# Patient Record
Sex: MALE | Race: WHITE | NOT HISPANIC OR LATINO | Employment: OTHER | URBAN - METROPOLITAN AREA
[De-identification: names, ages, dates, MRNs, and addresses within clinical notes are randomized per-mention and may not be internally consistent; named-entity substitution may affect disease eponyms.]

---

## 2018-05-04 ENCOUNTER — HOSPITAL ENCOUNTER (EMERGENCY)
Facility: HOSPITAL | Age: 63
Discharge: HOME | End: 2018-05-04
Attending: EMERGENCY MEDICINE
Payer: MEDICARE

## 2018-05-04 VITALS
SYSTOLIC BLOOD PRESSURE: 130 MMHG | HEART RATE: 83 BPM | DIASTOLIC BLOOD PRESSURE: 83 MMHG | RESPIRATION RATE: 18 BRPM | BODY MASS INDEX: 24.34 KG/M2 | OXYGEN SATURATION: 99 % | WEIGHT: 170 LBS | HEIGHT: 70 IN

## 2018-05-04 DIAGNOSIS — S01.81XA FACIAL LACERATION, INITIAL ENCOUNTER: Primary | ICD-10-CM

## 2018-05-04 PROCEDURE — 12014 RPR F/E/E/N/L/M 5.1-7.5 CM: CPT

## 2018-05-04 PROCEDURE — 63600175 PHARM REV CODE 636 W HCPCS: Performed by: PHYSICIAN ASSISTANT

## 2018-05-04 PROCEDURE — 12015 RPR F/E/E/N/L/M 7.6-12.5 CM: CPT | Mod: ,,, | Performed by: PHYSICIAN ASSISTANT

## 2018-05-04 PROCEDURE — 99283 EMERGENCY DEPT VISIT LOW MDM: CPT | Mod: 25,,, | Performed by: PHYSICIAN ASSISTANT

## 2018-05-04 PROCEDURE — 90715 TDAP VACCINE 7 YRS/> IM: CPT | Performed by: PHYSICIAN ASSISTANT

## 2018-05-04 PROCEDURE — 25000003 PHARM REV CODE 250: Performed by: PHYSICIAN ASSISTANT

## 2018-05-04 PROCEDURE — 99283 EMERGENCY DEPT VISIT LOW MDM: CPT | Mod: 25

## 2018-05-04 PROCEDURE — 90471 IMMUNIZATION ADMIN: CPT | Performed by: PHYSICIAN ASSISTANT

## 2018-05-04 RX ORDER — HYDROCODONE BITARTRATE AND ACETAMINOPHEN 5; 325 MG/1; MG/1
1 TABLET ORAL
Status: COMPLETED | OUTPATIENT
Start: 2018-05-04 | End: 2018-05-04

## 2018-05-04 RX ORDER — LIDOCAINE HYDROCHLORIDE AND EPINEPHRINE 10; 10 MG/ML; UG/ML
10 INJECTION, SOLUTION INFILTRATION; PERINEURAL
Status: COMPLETED | OUTPATIENT
Start: 2018-05-04 | End: 2018-05-04

## 2018-05-04 RX ORDER — HYDROCODONE BITARTRATE AND ACETAMINOPHEN 5; 325 MG/1; MG/1
1 TABLET ORAL EVERY 4 HOURS PRN
Qty: 10 TABLET | Refills: 0 | Status: SHIPPED | OUTPATIENT
Start: 2018-05-04 | End: 2024-03-25

## 2018-05-04 RX ADMIN — LIDOCAINE HYDROCHLORIDE AND EPINEPHRINE 10 ML: 10; 10 INJECTION, SOLUTION INFILTRATION; PERINEURAL at 04:05

## 2018-05-04 RX ADMIN — HYDROCODONE BITARTRATE AND ACETAMINOPHEN 1 TABLET: 5; 325 TABLET ORAL at 04:05

## 2018-05-04 RX ADMIN — CLOSTRIDIUM TETANI TOXOID ANTIGEN (FORMALDEHYDE INACTIVATED), CORYNEBACTERIUM DIPHTHERIAE TOXOID ANTIGEN (FORMALDEHYDE INACTIVATED), BORDETELLA PERTUSSIS TOXOID ANTIGEN (GLUTARALDEHYDE INACTIVATED), BORDETELLA PERTUSSIS FILAMENTOUS HEMAGGLUTININ ANTIGEN (FORMALDEHYDE INACTIVATED), BORDETELLA PERTUSSIS PERTACTIN ANTIGEN, AND BORDETELLA PERTUSSIS FIMBRIAE 2/3 ANTIGEN 0.5 ML: 5; 2; 2.5; 5; 3; 5 INJECTION, SUSPENSION INTRAMUSCULAR at 04:05

## 2018-05-04 NOTE — ED PROVIDER NOTES
Encounter Date: 5/4/2018    SCRIBE #1 NOTE: I, Loraine Garcia, am scribing for, and in the presence of,  Dr. Graf . I have scribed the following portions of the note - the APC attestation.       History     Chief Complaint   Patient presents with    Laceration     reports slipping and falling onto knife at home tonight, long laceration noted to right cheek, bleeding currently controlled with gauze      61 yo M with laceration to R side of face. Pt reports falling on a knife @ home while cooking. Bleeding controlled @ this time. Tetanus status is unknown.           Review of patient's allergies indicates:   Allergen Reactions    Codeine Nausea Only    Toradol [ketorolac]      headahce     Past Medical History:   Diagnosis Date    Back pain, chronic      Past Surgical History:   Procedure Laterality Date    BACK SURGERY      EYE SURGERY       No family history on file.  Social History   Substance Use Topics    Smoking status: Current Every Day Smoker     Packs/day: 0.25     Types: Cigarettes    Smokeless tobacco: Not on file    Alcohol use No     Review of Systems   Constitutional: Negative for fever.   HENT: Negative for sore throat.    Respiratory: Negative for shortness of breath.    Cardiovascular: Negative for chest pain.   Gastrointestinal: Negative for nausea.   Genitourinary: Negative for dysuria.   Musculoskeletal: Negative for back pain.   Skin: Positive for wound. Negative for rash.   Neurological: Negative for weakness.   Hematological: Does not bruise/bleed easily.       Physical Exam     Initial Vitals [05/04/18 0359]   BP Pulse Resp Temp SpO2   130/83 83 18 -- 99 %      MAP       98.67         Physical Exam    Constitutional: Vital signs are normal. He appears well-developed and well-nourished. He is not diaphoretic. No distress.   HENT:   Head: Normocephalic and atraumatic.   Right Ear: External ear normal.   Left Ear: External ear normal.   Eyes: Conjunctivae are normal.    Cardiovascular: Normal rate, regular rhythm and normal heart sounds. Exam reveals no gallop and no friction rub.    No murmur heard.  Pulmonary/Chest: No respiratory distress. He has no wheezes. He has no rhonchi. He has no rales. He exhibits no tenderness.   Abdominal: Soft. Normal appearance and bowel sounds are normal. He exhibits no distension and no mass. There is no tenderness. There is no rebound and no guarding.   Musculoskeletal: Normal range of motion.   Neurological: He is alert and oriented to person, place, and time.   Skin: Skin is warm and intact.   Linear laceration R cheek,  From eye to just above the lip, approx 4 inches.   Clean edges, not deep, not through and through    Neurovascular intact     Psychiatric: He has a normal mood and affect. His speech is normal and behavior is normal. Cognition and memory are normal.         ED Course   Lac Repair  Date/Time: 5/4/2018 5:46 AM  Performed by: JENNIE SHARMA  Authorized by: BENJA KLEIN   Body area: head/neck  Location details: right cheek  Laceration length: 9 cm  Foreign bodies: no foreign bodies  Tendon involvement: none  Nerve involvement: none  Vascular damage: no  Anesthesia: local infiltration    Anesthesia:  Local Anesthetic: lidocaine 1% with epinephrine  Anesthetic total: 5 mL  Patient sedated: no  Preparation: Patient was prepped and draped in the usual sterile fashion.  Irrigation solution: saline  Irrigation method: jet lavage  Amount of cleaning: standard  Debridement: none  Degree of undermining: none  Skin closure: 6-0 Prolene  Number of sutures: 10  Technique: simple  Approximation: close  Approximation difficulty: simple  Dressing: 4x4 sterile gauze  Patient tolerance: Patient tolerated the procedure well with no immediate complications        Labs Reviewed - No data to display          Medical Decision Making:   ED Management:  61 yo M with facial laceration  Lac repaired by me in the ED without complications  Wound  care instructions given. Recommended followup with PCP in 2 days for wound check and 5 days for suture removal. Retrun instructions given.             Scribe Attestation:   Scribe #1: I performed the above scribed service and the documentation accurately describes the services I performed. I attest to the accuracy of the note.    Attending Attestation:     Physician Attestation Statement for NP/PA:   I discussed this assessment and plan of this patient with the NP/PA, but I did not personally examine the patient. The face to face encounter was performed by the NP/PA.                     Clinical Impression:   The encounter diagnosis was Facial laceration, initial encounter.    Disposition:   Disposition: Discharged  Condition: Stable                        Christopher Martin PA-C  05/04/18 0510

## 2018-05-04 NOTE — ED NOTES
"Chief Complaint   Patient presents with    Laceration     reports slipping and falling onto knife at home tonight, long laceration noted to right cheek, bleeding currently controlled with gauze      Pt presents to ED after he was "cutting up eggs and I dropped some on the floor. I thought I had cleaned it up but I slipped on it and I fell onto the knife." +Lac to right cheek. Bleeding controlled at this time- gauze placed to right cheek in triage. States he had a tetanus shot within the last 5 years.     Adult physical assessment:    Neuro: Patient AAOx4.   HEENT: PERRLA, no purulent drainage to nares, throat, eyes. +Lac to right cheek. Right eye missing from prior incident in 1995 that was unrelated to tonight's incident.  Musculoskeletal: Patient moving all extremities spontaneously. No obvious swelling or deformity visible.    Active Ambulatory Problems     Diagnosis Date Noted    Laceration 09/09/2013    Facial trauma 09/09/2013     Resolved Ambulatory Problems     Diagnosis Date Noted    No Resolved Ambulatory Problems     Past Medical History:   Diagnosis Date    Back pain, chronic      Review of patient's allergies indicates:   Allergen Reactions    Codeine Nausea Only    Toradol [ketorolac]      TONIE Worley, at bedside.  "

## 2018-05-04 NOTE — DISCHARGE INSTRUCTIONS
Followup with PCP  Clean wound with soap and water  Return to the ED for any new symptoms      Our goal in the emergency department is to always give you outstanding care and exceptional service. You may receive a survey by mail or e-mail in the next week regarding your experience in our ED. We would greatly appreciate your completing and returning the survey. Your feedback provides us with a way to recognize our staff who give very good care and it helps us learn how to improve when your experience was below our aspiration of excellence.

## 2018-12-26 ENCOUNTER — HOSPITAL ENCOUNTER (EMERGENCY)
Facility: HOSPITAL | Age: 63
Discharge: HOME | End: 2018-12-26
Attending: EMERGENCY MEDICINE
Payer: MEDICARE

## 2018-12-26 VITALS
RESPIRATION RATE: 20 BRPM | WEIGHT: 150 LBS | TEMPERATURE: 98 F | SYSTOLIC BLOOD PRESSURE: 139 MMHG | BODY MASS INDEX: 21.47 KG/M2 | HEART RATE: 79 BPM | HEIGHT: 70 IN | OXYGEN SATURATION: 99 % | DIASTOLIC BLOOD PRESSURE: 70 MMHG

## 2018-12-26 DIAGNOSIS — K59.00 CONSTIPATION, UNSPECIFIED CONSTIPATION TYPE: Primary | ICD-10-CM

## 2018-12-26 LAB
ALBUMIN SERPL BCP-MCNC: 3.8 G/DL
ALP SERPL-CCNC: 88 U/L
ALT SERPL W/O P-5'-P-CCNC: 14 U/L
ANION GAP SERPL CALC-SCNC: 9 MMOL/L
AST SERPL-CCNC: 20 U/L
BASOPHILS # BLD AUTO: 0.05 K/UL
BASOPHILS NFR BLD: 0.5 %
BILIRUB SERPL-MCNC: 0.2 MG/DL
BUN SERPL-MCNC: 10 MG/DL
BUN SERPL-MCNC: 10 MG/DL (ref 6–30)
CALCIUM SERPL-MCNC: 9.3 MG/DL
CHLORIDE SERPL-SCNC: 104 MMOL/L
CHLORIDE SERPL-SCNC: 99 MMOL/L (ref 95–110)
CO2 SERPL-SCNC: 30 MMOL/L
CREAT SERPL-MCNC: 0.9 MG/DL
CREAT SERPL-MCNC: 0.9 MG/DL (ref 0.5–1.4)
DIFFERENTIAL METHOD: ABNORMAL
EOSINOPHIL # BLD AUTO: 0.1 K/UL
EOSINOPHIL NFR BLD: 1.5 %
ERYTHROCYTE [DISTWIDTH] IN BLOOD BY AUTOMATED COUNT: 12.8 %
EST. GFR  (AFRICAN AMERICAN): >60 ML/MIN/1.73 M^2
EST. GFR  (NON AFRICAN AMERICAN): >60 ML/MIN/1.73 M^2
GLUCOSE SERPL-MCNC: 102 MG/DL (ref 70–110)
GLUCOSE SERPL-MCNC: 99 MG/DL
HCT VFR BLD AUTO: 43.3 %
HCT VFR BLD CALC: 43 %PCV (ref 36–54)
HGB BLD-MCNC: 14.2 G/DL
IMM GRANULOCYTES # BLD AUTO: 0.01 K/UL
IMM GRANULOCYTES NFR BLD AUTO: 0.1 %
LIPASE SERPL-CCNC: 24 U/L
LYMPHOCYTES # BLD AUTO: 2.9 K/UL
LYMPHOCYTES NFR BLD: 31.8 %
MCH RBC QN AUTO: 31.3 PG
MCHC RBC AUTO-ENTMCNC: 32.8 G/DL
MCV RBC AUTO: 95 FL
MONOCYTES # BLD AUTO: 0.7 K/UL
MONOCYTES NFR BLD: 7.4 %
NEUTROPHILS # BLD AUTO: 5.3 K/UL
NEUTROPHILS NFR BLD: 58.7 %
NRBC BLD-RTO: 0 /100 WBC
PLATELET # BLD AUTO: 276 K/UL
PMV BLD AUTO: 11.4 FL
POC IONIZED CALCIUM: 1.12 MMOL/L (ref 1.06–1.42)
POC TCO2 (MEASURED): 29 MMOL/L (ref 23–29)
POTASSIUM BLD-SCNC: 4 MMOL/L (ref 3.5–5.1)
POTASSIUM SERPL-SCNC: 4.1 MMOL/L
PROT SERPL-MCNC: 7.4 G/DL
RBC # BLD AUTO: 4.54 M/UL
SAMPLE: NORMAL
SODIUM BLD-SCNC: 144 MMOL/L (ref 136–145)
SODIUM SERPL-SCNC: 143 MMOL/L
WBC # BLD AUTO: 9.1 K/UL

## 2018-12-26 PROCEDURE — 99284 EMERGENCY DEPT VISIT MOD MDM: CPT | Mod: ,,, | Performed by: EMERGENCY MEDICINE

## 2018-12-26 PROCEDURE — 85025 COMPLETE CBC W/AUTO DIFF WBC: CPT

## 2018-12-26 PROCEDURE — 83690 ASSAY OF LIPASE: CPT

## 2018-12-26 PROCEDURE — 25000003 PHARM REV CODE 250: Performed by: EMERGENCY MEDICINE

## 2018-12-26 PROCEDURE — 80053 COMPREHEN METABOLIC PANEL: CPT

## 2018-12-26 PROCEDURE — 25500020 PHARM REV CODE 255: Performed by: EMERGENCY MEDICINE

## 2018-12-26 PROCEDURE — 99285 EMERGENCY DEPT VISIT HI MDM: CPT | Mod: 25

## 2018-12-26 RX ORDER — SENNOSIDES 8.6 MG/1
1 TABLET ORAL DAILY
COMMUNITY
Start: 2018-12-26 | End: 2024-03-25

## 2018-12-26 RX ORDER — ACETAMINOPHEN 500 MG
1000 TABLET ORAL
Status: COMPLETED | OUTPATIENT
Start: 2018-12-26 | End: 2018-12-26

## 2018-12-26 RX ORDER — POLYETHYLENE GLYCOL 3350 17 G/17G
17 POWDER, FOR SOLUTION ORAL DAILY
Qty: 119 G | Refills: 0 | Status: SHIPPED | OUTPATIENT
Start: 2018-12-26 | End: 2024-03-25

## 2018-12-26 RX ADMIN — IOHEXOL 75 ML: 350 INJECTION, SOLUTION INTRAVENOUS at 10:12

## 2018-12-26 RX ADMIN — ACETAMINOPHEN 1000 MG: 500 TABLET ORAL at 07:12

## 2018-12-27 NOTE — ED TRIAGE NOTES
63 year old male presents to the ED c/o left sided flank pain. Reports that it began Monday. Feels like pulled muscle. Pain worsened today.

## 2018-12-27 NOTE — ED PROVIDER NOTES
"Encounter Date: 12/26/2018    SCRIBE #1 NOTE: I, Aziza Vega, am scribing for, and in the presence of,  Dr. Streeter. I have scribed the entire note.       History     Chief Complaint   Patient presents with    Flank Pain     Time patient was seen by the provider: 7:30 PM      The patient is a 63 y.o. male with co-morbidities including: chronic back pain and depression, who presents to the ED with a complaint of left flank pain for 3 days. Patient reports he woke up with this pain 3 days ago and states it "feels like a horse kicked him". He states the pain is constant but worse with movement and deep breaths. He also notes he has lost 15 pounds since starting Prozac 1.5 months ago. He denies SOB, cough, fever, hematuria, dysuria, nausea, and vomiting.       The history is provided by the patient.     Review of patient's allergies indicates:   Allergen Reactions    Codeine Nausea Only    Toradol [ketorolac]      headahce     Past Medical History:   Diagnosis Date    Back pain, chronic      Past Surgical History:   Procedure Laterality Date    BACK SURGERY      EYE SURGERY       No family history on file.  Social History     Tobacco Use    Smoking status: Current Every Day Smoker     Packs/day: 0.25     Types: Cigarettes   Substance Use Topics    Alcohol use: No    Drug use: No     Review of Systems   Constitutional: Negative for fever.   HENT: Negative for sore throat.    Eyes: Negative for visual disturbance.   Respiratory: Negative for cough and shortness of breath.    Cardiovascular: Negative for chest pain.   Gastrointestinal: Negative for diarrhea, nausea and vomiting.   Genitourinary: Positive for flank pain (left). Negative for dysuria and hematuria.   Musculoskeletal: Negative for back pain.   Skin: Negative for rash.   Neurological: Negative for weakness.       Physical Exam     Initial Vitals [12/26/18 1710]   BP Pulse Resp Temp SpO2   (!) 143/78 84 16 98.5 °F (36.9 °C) 98 %      MAP       --     "     Physical Exam    Nursing note and vitals reviewed.  Constitutional: He appears well-developed and well-nourished. No distress.   HENT:   Head: Normocephalic and atraumatic.   Eyes: Pupils are equal, round, and reactive to light.   Cardiovascular: Normal rate and regular rhythm.   Pulmonary/Chest: Breath sounds normal. No respiratory distress. He has no wheezes. He has no rhonchi. He has no rales.   Abdominal: He exhibits no distension. There is tenderness.   Left upper quadrant and left mid abdominal tenderness.    Musculoskeletal: He exhibits no edema or tenderness.   Neurological: He is alert and oriented to person, place, and time.   Skin: No rash noted.         ED Course   Procedures  Labs Reviewed   CBC W/ AUTO DIFFERENTIAL - Abnormal; Notable for the following components:       Result Value    RBC 4.54 (*)     MCH 31.3 (*)     All other components within normal limits   COMPREHENSIVE METABOLIC PANEL - Abnormal; Notable for the following components:    CO2 30 (*)     All other components within normal limits   LIPASE   ISTAT PROCEDURE          Imaging Results          CT Abdomen Pelvis With Contrast (Final result)  Result time 12/26/18 23:16:00    Final result by Mick Sullivan MD (12/26/18 23:16:00)                 Impression:      Colonic diverticulosis without diverticulitis.    Significant burden of stool throughout the suggestive for some degree of constipation.    Mild calcific atherosclerosis and ectasia of the distal abdominal aorta.    Stable postoperative change of the lumbar spine with associated bone harvesting of the iliacs.    Additional, stable findings as above.    Electronically signed by resident: Reinaldo King  Date:    12/26/2018  Time:    22:45    Electronically signed by: Mick Sullivan MD  Date:    12/26/2018  Time:    23:16             Narrative:    EXAMINATION:  CT ABDOMEN PELVIS WITH CONTRAST    CLINICAL HISTORY:  LLQ pain, suspect diverticulitis;    TECHNIQUE:  Low dose axial  images, sagittal and coronal reformations were obtained from the lung bases to the pubic symphysis following the IV administration of 75 mL of Omnipaque 350 .  Oral contrast was not administered..    COMPARISON:  Lumbar spine radiograph 06/07/2015, CT abdomen pelvis 03/20/2011    FINDINGS:  ABDOMEN:    - Heart base: Normal in size without pericardial effusion.    - Lung bases: Lung bases symmetrically expanded without acute consolidation, pleural effusion, pneumothorax, or mass.    - Liver: Liver is normal in size with flash filling hemangioma located within the right hepatic lobe.    - Gallbladder: No calcified gallstones.    - Bile Ducts: No evidence of intra or extra hepatic biliary ductal dilation.    - Spleen: Negative.    - Kidneys: Normal in size with normal contrast enhancement and excretion patterns.  No hydroureteronephrosis or enhancing mass.  Simple renal cyst within the midpole of the right kidney.    - Adrenals: Mild nonspecific thickening of bilateral adrenals.    - Pancreas: No mass or peripancreatic fat stranding.    - Retroperitoneum:  No significant adenopathy.    - Vascular: Mild ectasia of the abdominal aorta just proximal to its bifurcation.  Mild calcific atherosclerosis of the distal abdominal aorta.    - Abdominal wall:  Unremarkable.    PELVIS:    Urinary bladder is decompressed and poorly evaluated.  No pelvic mass or free fluid.    BOWEL/MESENTERY:    No evidence of bowel obstruction or inflammation.  Colonic diverticulosis without diverticulitis significant burden of stool within the colon suggesting some degree of constipation.    BONES:  Stable postoperative change of the lumbar spine with associated bone harvesting of the iliacs.  No acute fracture or aggressive osseous lesion.                                 Medical Decision Making:   History:   Old Medical Records: I decided to obtain old medical records.  Initial Assessment:   LUQ/L mid abd pain x 3 days, worsening, somewhat  pleuritic though localized in upper abd > chest/thorax  No resp or GI sx  No fever/chills    + wt loss over past 2 months    On exam, VSS, well-appearing  + moderate LUQ/L mid abd tenderness  Differential Diagnosis:   Diverticulitis  Muscle strain  Renal colic  Malignancy    Very low suspicion for ACS or PE given that pain completely reproducible on exam, more localized to abd than thorax  Clinical Tests:   Lab Tests: Ordered and Reviewed  Radiological Study: Ordered and Reviewed  ED Management:  Labs  CT abd/pelvis  Pain meds    11:24 PM  CT shows constipation, no diverticulitis  Will d/c home with laxatives            Scribe Attestation:   Scribe #1: I performed the above scribed service and the documentation accurately describes the services I performed. I attest to the accuracy of the note.    I, Dr. Barb Streeter, personally performed the services described in this documentation. All medical record entries made by the scribe were at my direction and in my presence.  I have reviewed the chart and agree that the record reflects my personal performance and is accurate and complete. Barb Streeter MD.  7:57 PM 12/27/2018           Clinical Impression:   The encounter diagnosis was Constipation, unspecified constipation type.                             Barb Streeter MD  12/27/18 1511

## 2019-11-04 ENCOUNTER — HOSPITAL ENCOUNTER (OUTPATIENT)
Facility: HOSPITAL | Age: 64
Discharge: HOME | End: 2019-11-04
Attending: EMERGENCY MEDICINE | Admitting: EMERGENCY MEDICINE
Payer: MEDICARE

## 2019-11-04 VITALS
DIASTOLIC BLOOD PRESSURE: 61 MMHG | TEMPERATURE: 98 F | RESPIRATION RATE: 18 BRPM | OXYGEN SATURATION: 98 % | SYSTOLIC BLOOD PRESSURE: 113 MMHG | HEART RATE: 75 BPM

## 2019-11-04 DIAGNOSIS — M79.604 RIGHT LEG PAIN: ICD-10-CM

## 2019-11-04 DIAGNOSIS — T50.901A ACCIDENTAL DRUG OVERDOSE, INITIAL ENCOUNTER: Primary | ICD-10-CM

## 2019-11-04 LAB
ALBUMIN SERPL BCP-MCNC: 3.8 G/DL (ref 3.5–5.2)
ALP SERPL-CCNC: 81 U/L (ref 55–135)
ALT SERPL W/O P-5'-P-CCNC: 49 U/L (ref 10–44)
ANION GAP SERPL CALC-SCNC: 10 MMOL/L (ref 8–16)
APAP SERPL-MCNC: <3 UG/ML (ref 10–20)
AST SERPL-CCNC: 74 U/L (ref 10–40)
BASOPHILS # BLD AUTO: 0.03 K/UL (ref 0–0.2)
BASOPHILS NFR BLD: 0.2 % (ref 0–1.9)
BILIRUB SERPL-MCNC: 0.3 MG/DL (ref 0.1–1)
BUN SERPL-MCNC: 15 MG/DL (ref 8–23)
CALCIUM SERPL-MCNC: 8.7 MG/DL (ref 8.7–10.5)
CHLORIDE SERPL-SCNC: 100 MMOL/L (ref 95–110)
CO2 SERPL-SCNC: 29 MMOL/L (ref 23–29)
CREAT SERPL-MCNC: 0.8 MG/DL (ref 0.5–1.4)
DIFFERENTIAL METHOD: ABNORMAL
EOSINOPHIL # BLD AUTO: 0 K/UL (ref 0–0.5)
EOSINOPHIL NFR BLD: 0.2 % (ref 0–8)
ERYTHROCYTE [DISTWIDTH] IN BLOOD BY AUTOMATED COUNT: 13.5 % (ref 11.5–14.5)
EST. GFR  (AFRICAN AMERICAN): >60 ML/MIN/1.73 M^2
EST. GFR  (NON AFRICAN AMERICAN): >60 ML/MIN/1.73 M^2
GLUCOSE SERPL-MCNC: 111 MG/DL (ref 70–110)
HCT VFR BLD AUTO: 37.5 % (ref 40–54)
HGB BLD-MCNC: 12.5 G/DL (ref 14–18)
IMM GRANULOCYTES # BLD AUTO: 0.04 K/UL (ref 0–0.04)
IMM GRANULOCYTES NFR BLD AUTO: 0.3 % (ref 0–0.5)
LYMPHOCYTES # BLD AUTO: 1.2 K/UL (ref 1–4.8)
LYMPHOCYTES NFR BLD: 9.8 % (ref 18–48)
MCH RBC QN AUTO: 30.6 PG (ref 27–31)
MCHC RBC AUTO-ENTMCNC: 33.3 G/DL (ref 32–36)
MCV RBC AUTO: 92 FL (ref 82–98)
MONOCYTES # BLD AUTO: 0.8 K/UL (ref 0.3–1)
MONOCYTES NFR BLD: 6.5 % (ref 4–15)
NEUTROPHILS # BLD AUTO: 10.1 K/UL (ref 1.8–7.7)
NEUTROPHILS NFR BLD: 83 % (ref 38–73)
NRBC BLD-RTO: 0 /100 WBC
PLATELET # BLD AUTO: 177 K/UL (ref 150–350)
PMV BLD AUTO: 10.9 FL (ref 9.2–12.9)
POCT GLUCOSE: 114 MG/DL (ref 70–110)
POTASSIUM SERPL-SCNC: 4.6 MMOL/L (ref 3.5–5.1)
PROT SERPL-MCNC: 6.7 G/DL (ref 6–8.4)
RBC # BLD AUTO: 4.09 M/UL (ref 4.6–6.2)
SODIUM SERPL-SCNC: 139 MMOL/L (ref 136–145)
WBC # BLD AUTO: 12.21 K/UL (ref 3.9–12.7)

## 2019-11-04 PROCEDURE — G0378 HOSPITAL OBSERVATION PER HR: HCPCS

## 2019-11-04 PROCEDURE — 99234 PR OBSERV/HOSP SAME DATE,LEVL III: ICD-10-PCS | Mod: ,,, | Performed by: PHYSICIAN ASSISTANT

## 2019-11-04 PROCEDURE — 80053 COMPREHEN METABOLIC PANEL: CPT

## 2019-11-04 PROCEDURE — 80329 ANALGESICS NON-OPIOID 1 OR 2: CPT

## 2019-11-04 PROCEDURE — 99234 HOSP IP/OBS SM DT SF/LOW 45: CPT | Mod: ,,, | Performed by: PHYSICIAN ASSISTANT

## 2019-11-04 PROCEDURE — 94761 N-INVAS EAR/PLS OXIMETRY MLT: CPT

## 2019-11-04 PROCEDURE — 85025 COMPLETE CBC W/AUTO DIFF WBC: CPT

## 2019-11-04 PROCEDURE — 99285 EMERGENCY DEPT VISIT HI MDM: CPT | Mod: 25

## 2019-11-04 PROCEDURE — 99284 EMERGENCY DEPT VISIT MOD MDM: CPT | Mod: ,,, | Performed by: PHYSICIAN ASSISTANT

## 2019-11-04 PROCEDURE — 27000221 HC OXYGEN, UP TO 24 HOURS

## 2019-11-04 PROCEDURE — 99284 PR EMERGENCY DEPT VISIT,LEVEL IV: ICD-10-PCS | Mod: ,,, | Performed by: PHYSICIAN ASSISTANT

## 2019-11-04 RX ORDER — NALOXONE HCL 0.4 MG/ML
0.4 VIAL (ML) INJECTION
Status: DISCONTINUED | OUTPATIENT
Start: 2019-11-04 | End: 2019-11-04

## 2019-11-04 RX ORDER — SODIUM CHLORIDE 0.9 % (FLUSH) 0.9 %
10 SYRINGE (ML) INJECTION
Status: DISCONTINUED | OUTPATIENT
Start: 2019-11-04 | End: 2019-11-04 | Stop reason: HOSPADM

## 2019-11-04 NOTE — HOSPITAL COURSE
Patient admitted to observation briefly for accidental drug overdose. He was stable and able to ambulate. He did not require additional narcan while briefly in observation. Patient stable for discharge to home with ride from friend.

## 2019-11-04 NOTE — ED NOTES
Patient found outside of home lethargic, uncooperative with pin point pupil per EMS. Given 0.5mg of Narcan. Patient alert and oriented at this time. Patient unsure of what happened this evening. States that he took his usual 2mg of xanax tonight, and one dose of hydrocodone tonight. Patient is unsure of how he ended up outside.

## 2019-11-04 NOTE — SUBJECTIVE & OBJECTIVE
Past Medical History:   Diagnosis Date    Back pain, chronic        Past Surgical History:   Procedure Laterality Date    BACK SURGERY      EYE SURGERY         Review of patient's allergies indicates:   Allergen Reactions    Codeine Nausea Only    Toradol [ketorolac]      headahce       No current facility-administered medications on file prior to encounter.      Current Outpatient Medications on File Prior to Encounter   Medication Sig    alprazolam (XANAX) 0.5 MG tablet Take 0.5 mg by mouth 3 (three) times daily.    gabapentin (NEURONTIN) 300 MG capsule Take 1 capsule (300 mg total) by mouth 3 (three) times daily. Take 1 cap once daily for 3 days then take 1 cap twice daily for 3 days then take 1 cap 3 times daily    hydrocodone-acetaminophen  (LORTAB)  mg per tablet Take 1 tablet by mouth every 6 (six) hours as needed for Pain.    hydrocodone-acetaminophen 5-325mg (NORCO) 5-325 mg per tablet Take 1 tablet by mouth every 4 (four) hours as needed for Pain.    polyethylene glycol (GLYCOLAX) 17 gram/dose powder Take 17 g by mouth once daily.    senna (SENNA) 8.6 mg tablet Take 1 tablet by mouth once daily.     Family History     None        Tobacco Use    Smoking status: Current Every Day Smoker     Packs/day: 0.25     Types: Cigarettes   Substance and Sexual Activity    Alcohol use: No    Drug use: No    Sexual activity: Not on file     Review of Systems   Constitutional: Negative for activity change, chills and fever.   HENT: Negative for trouble swallowing.    Eyes: Negative for photophobia and visual disturbance.   Respiratory: Negative for chest tightness, shortness of breath and wheezing.    Cardiovascular: Negative for chest pain, palpitations and leg swelling.   Gastrointestinal: Negative for abdominal pain, constipation, diarrhea, nausea and vomiting.   Genitourinary: Negative for dysuria, frequency, hematuria and urgency.   Musculoskeletal: Negative for arthralgias, back pain and  gait problem.   Skin: Negative for color change and rash.   Neurological: Positive for syncope. Negative for dizziness, weakness, light-headedness, numbness and headaches.   Psychiatric/Behavioral: Negative for agitation and confusion. The patient is not nervous/anxious.      Objective:     Vital Signs (Most Recent):  Temp: 97.9 °F (36.6 °C) (11/04/19 0050)  Pulse: 75 (11/04/19 0732)  Resp: 18 (11/04/19 0701)  BP: 113/61 (11/04/19 0732)  SpO2: 98 % (11/04/19 0732) Vital Signs (24h Range):  Temp:  [97.9 °F (36.6 °C)] 97.9 °F (36.6 °C)  Pulse:  [] 75  Resp:  [18-20] 18  SpO2:  [96 %-99 %] 98 %  BP: (105-165)/(56-90) 113/61        There is no height or weight on file to calculate BMI.    Physical Exam   Constitutional: He is oriented to person, place, and time. He appears well-developed and well-nourished. No distress.   HENT:   Head: Normocephalic and atraumatic.   Mouth/Throat: No oropharyngeal exudate.   Eyes: Pupils are equal, round, and reactive to light. Conjunctivae and EOM are normal.   Neck: Normal range of motion. Neck supple.   Cardiovascular: Normal rate, regular rhythm, normal heart sounds and intact distal pulses.   Pulmonary/Chest: Effort normal and breath sounds normal. No respiratory distress. He has no wheezes.   Abdominal: Soft. Bowel sounds are normal. He exhibits no distension. There is no tenderness.   Musculoskeletal: Normal range of motion. He exhibits tenderness (right hip). He exhibits no edema.   Lymphadenopathy:     He has no cervical adenopathy.   Neurological: He is alert and oriented to person, place, and time. No cranial nerve deficit or sensory deficit. Coordination normal.   Skin: Skin is warm and dry. Capillary refill takes less than 2 seconds. No rash noted.   Psychiatric: He has a normal mood and affect. His behavior is normal. Judgment and thought content normal.   Nursing note and vitals reviewed.        CRANIAL NERVES     CN III, IV, VI   Pupils are equal, round, and  reactive to light.  Extraocular motions are normal.        Significant Labs:   BMP:   Recent Labs   Lab 11/04/19 0104   *      K 4.6      CO2 29   BUN 15   CREATININE 0.8   CALCIUM 8.7     CBC:   Recent Labs   Lab 11/04/19 0104   WBC 12.21   HGB 12.5*   HCT 37.5*          Significant Imaging: I have reviewed all pertinent imaging results/findings within the past 24 hours.

## 2019-11-04 NOTE — ASSESSMENT & PLAN NOTE
Patient took 2 xanax and 2 norco earlier this morning. EMS activated for drug overdose, given 0.5mg narcan with positive response. He has not required additional narcan and has been stable since admission. He is requesting discharge.  - avoid opiates and benzos  - stable on room air  - able to ambulate  - no need for further monitoring in observation  - patient has ride to pick him up

## 2019-11-04 NOTE — HPI
Patient is a 63yo male with no PMH briefly admitted to observation for drug overdose. According to EMS, patient found unresponsive outside of home. EMS administered 0.5 mg IV narcan. Patient with positive response after narcan administration. He denies fever,chills, nausea, vomiting, sob, chest pain, abd pain, dysuria, diarrhea, or constipation. He is a current everyday smoker and denies alcohol use. Reports marijuana use. He takes xanax regularly and admits to taking 2 norco with 2 xanax. He was placed in observation around 6am and had been stable through my assessment at 8am. At the time of admission by this provider, patient alert and oriented on room air. He has not required additional narcan since arrival. He is requesting discharge.    Vitals stable, intake labs unremarkable. Films of right hip and femur negative for fracture. Patient able to ambulate.

## 2019-11-04 NOTE — DISCHARGE SUMMARY
[facility] Hospital Medicine  Discharge Summary      Patient Name: Bossman Hewitt  MRN: [id number]  Admission Date: 11/4/2019  Hospital Length of Stay: 0 days  Discharge Date and Time: No discharge date for patient encounter.  Attending Physician: Boris Maya MD   Discharging Provider: Adwoa Henry PA-C  Primary Care Provider: Mike Alonzo Iii, MD  [facility] Medicine Team: [facility] [place] Adwoa Henry PA-C    HPI:   Patient is a 65yo male with no PMH briefly admitted to observation for drug overdose. According to EMS, patient found unresponsive outside of home. EMS administered 0.5 mg IV narcan. Patient with positive response after narcan administration. He denies fever,chills, nausea, vomiting, sob, chest pain, abd pain, dysuria, diarrhea, or constipation. He is a current everyday smoker and denies alcohol use. Reports marijuana use. He takes xanax regularly and admits to taking 2 norco with 2 xanax. He was placed in observation around 6am and had been stable through my assessment at 8am. At the time of admission by this provider, patient alert and oriented on room air. He has not required additional narcan since arrival. He is requesting discharge.    Vitals stable, intake labs unremarkable. Films of right hip and femur negative for fracture. Patient able to ambulate.    * No surgery found *      Hospital Course:   Patient admitted to observation briefly for accidental drug overdose. He was stable and able to ambulate. He did not require additional narcan while briefly in observation. Patient stable for discharge to home with ride from friend.     Consults:     * Accidental drug overdose  Patient took 2 xanax and 2 norco earlier this morning. EMS activated for drug overdose, given 0.5mg narcan with positive response. He has not required additional narcan and has been stable since admission. He is requesting discharge.  - avoid opiates and benzos  - stable on room air  - able to  ambulate  - no need for further monitoring in observation  - patient has ride to pick him up      Final Active Diagnoses:    Diagnosis Date Noted POA    PRINCIPAL PROBLEM:  Accidental drug overdose [T50.901A] 11/04/2019 Yes      Problems Resolved During this Admission:       Discharged Condition: good    Disposition: Home or Self Care    Follow Up:    Patient Instructions:   No discharge procedures on file.    Significant Diagnostic Studies: Labs: All labs within the past 24 hours have been reviewed    Pending Diagnostic Studies:     None         Medications:  Reconciled Home Medications:      Medication List      CONTINUE taking these medications    ALPRAZolam 0.5 MG tablet  Commonly known as:  XANAX  Take 0.5 mg by mouth 3 (three) times daily.     gabapentin 300 MG capsule  Commonly known as:  NEURONTIN  Take 1 capsule (300 mg total) by mouth 3 (three) times daily. Take 1 cap once daily for 3 days then take 1 cap twice daily for 3 days then take 1 cap 3 times daily     * hydrocodone-acetaminophen   mg per tablet  Commonly known as:  LORTAB  Take 1 tablet by mouth every 6 (six) hours as needed for Pain.     * HYDROcodone-acetaminophen 5-325 mg per tablet  Commonly known as:  NORCO  Take 1 tablet by mouth every 4 (four) hours as needed for Pain.     polyethylene glycol 17 gram/dose powder  Commonly known as:  GLYCOLAX  Take 17 g by mouth once daily.     senna 8.6 mg tablet  Commonly known as:  SENNA  Take 1 tablet by mouth once daily.         * This list has 2 medication(s) that are the same as other medications prescribed for you. Read the directions carefully, and ask your doctor or other care provider to review them with you.                Indwelling Lines/Drains at time of discharge:   Lines/Drains/Airways     None                 Time spent on the discharge of patient: 32 minutes  Patient was seen and examined on the date of discharge and determined to be suitable for discharge.         Adwoa ALEGRIA  MARZENA Henry  Department of Hospital Medicine  [facility]

## 2019-11-04 NOTE — H&P
[facility] Hospital Medicine  History & Physical    Patient Name: Bossman Hewitt  MRN: [id number]  Admission Date: 11/4/2019  Attending Physician: Boris Maya MD   Primary Care Provider: Mike Alonzo Iii, MD    [facility] Medicine [place] [facility] [place] [facility] [place] Adwoa Henry PA-C     Patient information was obtained from patient, past medical records and ER records.     Subjective:     Principal Problem:Accidental drug overdose    Chief Complaint:   Chief Complaint   Patient presents with    Drug Overdose     found outside of home lethargic, uncooperative with pinpoint pupils per EMS. Given .5 narcan. also reports right upper femur pain. axo4 now.        HPI: Patient is a 63yo male with no PMH briefly admitted to observation for drug overdose. According to EMS, patient found unresponsive outside of home. EMS administered 0.5 mg IV narcan. Patient with positive response after narcan administration. He denies fever,chills, nausea, vomiting, sob, chest pain, abd pain, dysuria, diarrhea, or constipation. He is a current everyday smoker and denies alcohol use. Reports marijuana use. He takes xanax regularly and admits to taking 2 norco with 2 xanax. He was placed in observation around 6am and had been stable through my assessment at 8am. At the time of admission by this provider, patient alert and oriented on room air. He has not required additional narcan since arrival. He is requesting discharge.    Vitals stable, intake labs unremarkable. Films of right hip and femur negative for fracture. Patient able to ambulate.    Past Medical History:   Diagnosis Date    Back pain, chronic        Past Surgical History:   Procedure Laterality Date    BACK SURGERY      EYE SURGERY         Review of patient's allergies indicates:   Allergen Reactions    Codeine Nausea Only    Toradol [ketorolac]      headahce       No current facility-administered medications on file prior to encounter.      Current Outpatient  Medications on File Prior to Encounter   Medication Sig    alprazolam (XANAX) 0.5 MG tablet Take 0.5 mg by mouth 3 (three) times daily.    gabapentin (NEURONTIN) 300 MG capsule Take 1 capsule (300 mg total) by mouth 3 (three) times daily. Take 1 cap once daily for 3 days then take 1 cap twice daily for 3 days then take 1 cap 3 times daily    hydrocodone-acetaminophen  (LORTAB)  mg per tablet Take 1 tablet by mouth every 6 (six) hours as needed for Pain.    hydrocodone-acetaminophen 5-325mg (NORCO) 5-325 mg per tablet Take 1 tablet by mouth every 4 (four) hours as needed for Pain.    polyethylene glycol (GLYCOLAX) 17 gram/dose powder Take 17 g by mouth once daily.    senna (SENNA) 8.6 mg tablet Take 1 tablet by mouth once daily.     Family History     None        Tobacco Use    Smoking status: Current Every Day Smoker     Packs/day: 0.25     Types: Cigarettes   Substance and Sexual Activity    Alcohol use: No    Drug use: No    Sexual activity: Not on file     Review of Systems   Constitutional: Negative for activity change, chills and fever.   HENT: Negative for trouble swallowing.    Eyes: Negative for photophobia and visual disturbance.   Respiratory: Negative for chest tightness, shortness of breath and wheezing.    Cardiovascular: Negative for chest pain, palpitations and leg swelling.   Gastrointestinal: Negative for abdominal pain, constipation, diarrhea, nausea and vomiting.   Genitourinary: Negative for dysuria, frequency, hematuria and urgency.   Musculoskeletal: Negative for arthralgias, back pain and gait problem.   Skin: Negative for color change and rash.   Neurological: Positive for syncope. Negative for dizziness, weakness, light-headedness, numbness and headaches.   Psychiatric/Behavioral: Negative for agitation and confusion. The patient is not nervous/anxious.      Objective:     Vital Signs (Most Recent):  Temp: 97.9 °F (36.6 °C) (11/04/19 0050)  Pulse: 75 (11/04/19  0732)  Resp: 18 (11/04/19 0701)  BP: 113/61 (11/04/19 0732)  SpO2: 98 % (11/04/19 0732) Vital Signs (24h Range):  Temp:  [97.9 °F (36.6 °C)] 97.9 °F (36.6 °C)  Pulse:  [] 75  Resp:  [18-20] 18  SpO2:  [96 %-99 %] 98 %  BP: (105-165)/(56-90) 113/61        There is no height or weight on file to calculate BMI.    Physical Exam   Constitutional: He is oriented to person, place, and time. He appears well-developed and well-nourished. No distress.   HENT:   Head: Normocephalic and atraumatic.   Mouth/Throat: No oropharyngeal exudate.   Eyes: Pupils are equal, round, and reactive to light. Conjunctivae and EOM are normal.   Neck: Normal range of motion. Neck supple.   Cardiovascular: Normal rate, regular rhythm, normal heart sounds and intact distal pulses.   Pulmonary/Chest: Effort normal and breath sounds normal. No respiratory distress. He has no wheezes.   Abdominal: Soft. Bowel sounds are normal. He exhibits no distension. There is no tenderness.   Musculoskeletal: Normal range of motion. He exhibits tenderness (right hip). He exhibits no edema.   Lymphadenopathy:     He has no cervical adenopathy.   Neurological: He is alert and oriented to person, place, and time. No cranial nerve deficit or sensory deficit. Coordination normal.   Skin: Skin is warm and dry. Capillary refill takes less than 2 seconds. No rash noted.   Psychiatric: He has a normal mood and affect. His behavior is normal. Judgment and thought content normal.   Nursing note and vitals reviewed.        CRANIAL NERVES     CN III, IV, VI   Pupils are equal, round, and reactive to light.  Extraocular motions are normal.        Significant Labs:   BMP:   Recent Labs   Lab 11/04/19 0104   *      K 4.6      CO2 29   BUN 15   CREATININE 0.8   CALCIUM 8.7     CBC:   Recent Labs   Lab 11/04/19 0104   WBC 12.21   HGB 12.5*   HCT 37.5*          Significant Imaging: I have reviewed all pertinent imaging results/findings within the  past 24 hours.    Assessment/Plan:     * Accidental drug overdose  Patient took 2 xanax and 2 norco earlier this morning. EMS activated for drug overdose, given 0.5mg narcan with positive response. He has not required additional narcan and has been stable since admission. He is requesting discharge.  - avoid opiates and benzos  - stable on room air  - able to ambulate  - no need for further monitoring in observation  - patient has ride to pick him up        VTE Risk Mitigation (From admission, onward)         Ordered     IP VTE LOW RISK PATIENT  Once      11/04/19 0719     Place sequential compression device  Until discontinued      11/04/19 0719                   Adwoa Henry PA-C  Department of Hospital Medicine   [facility]

## 2019-11-04 NOTE — ED PROVIDER NOTES
Encounter Date: 11/4/2019       History     Chief Complaint   Patient presents with    Drug Overdose     found outside of home lethargic, uncooperative with pinpoint pupils per EMS. Given .5 narcan. also reports right upper femur pain. axo4 now.     Patient is a 64 year old male with PMHX of HLD and chronic back pain. He presents to the ED via EMS for drug overdose. Patient unable to recall events, which led him to [facility]. According to EMS, patient found unresponsive outside of home. EMS administered 0.5 mg IV narcan. Patient with positive response after narcan administration. He denies fever,chills, nausea, vomiting, sob, chest pain, abd pain, dysuria, diarrhea, or constipation. He is a current everyday smoker and denies alcohol use. Reports marijuana use.    The history is provided by the patient, the EMS personnel and medical records. No  was used.     Review of patient's allergies indicates:   Allergen Reactions    Codeine Nausea Only    Toradol [ketorolac]      headahce     Past Medical History:   Diagnosis Date    Back pain, chronic      Past Surgical History:   Procedure Laterality Date    BACK SURGERY      EYE SURGERY       History reviewed. No pertinent family history.  Social History     Tobacco Use    Smoking status: Current Every Day Smoker     Packs/day: 0.25     Types: Cigarettes   Substance Use Topics    Alcohol use: No    Drug use: No     Review of Systems   Constitutional: Negative for fever.   HENT: Negative for sore throat.    Respiratory: Negative for shortness of breath.    Cardiovascular: Negative for chest pain.   Gastrointestinal: Negative for abdominal pain, nausea and vomiting.   Genitourinary: Negative for dysuria.   Musculoskeletal: Negative for back pain.   Skin: Negative for rash.   Neurological: Negative for weakness.   Hematological: Does not bruise/bleed easily.       Physical Exam     Initial Vitals [11/04/19 0050]   BP Pulse Resp Temp SpO2   (!) 164/90  108 20 97.9 °F (36.6 °C) 97 %      MAP       --         Physical Exam    Vitals reviewed.  Constitutional: He appears well-developed and well-nourished. No distress.   HENT:   Head: Normocephalic.   No palpable tenderness over bony processes of face.    Eyes: Conjunctivae are normal.   Pinpoint pupil   Neck: Normal range of motion.   Cardiovascular: Normal rate and regular rhythm.   No murmur heard.  Pulmonary/Chest: Breath sounds normal. No respiratory distress. He has no wheezes. He has no rales.   Abdominal: Soft. Bowel sounds are normal. He exhibits no distension. There is no tenderness.   Musculoskeletal: Normal range of motion.   No midline spinal tenderness. No palpable tenderness over UE. TTP over right upper leg.   Neurological: He is alert and oriented to person, place, and time. No sensory deficit.   Skin: Skin is warm and dry. No erythema.         ED Course   Procedures  Labs Reviewed   CBC W/ AUTO DIFFERENTIAL - Abnormal; Notable for the following components:       Result Value    RBC 4.09 (*)     Hemoglobin 12.5 (*)     Hematocrit 37.5 (*)     Gran # (ANC) 10.1 (*)     Gran% 83.0 (*)     Lymph% 9.8 (*)     All other components within normal limits   COMPREHENSIVE METABOLIC PANEL - Abnormal; Notable for the following components:    Glucose 111 (*)     AST 74 (*)     ALT 49 (*)     All other components within normal limits   ACETAMINOPHEN LEVEL - Abnormal; Notable for the following components:    Acetaminophen (Tylenol), Serum <3.0 (*)     All other components within normal limits    Narrative:     add on acetm as per per Arias PA-C 11/04/2019  06:45    POCT GLUCOSE - Abnormal; Notable for the following components:    POCT Glucose 114 (*)     All other components within normal limits   ACETAMINOPHEN LEVEL   URINALYSIS, REFLEX TO URINE CULTURE   DRUG SCREEN PANEL, URINE EMERGENCY   POCT GLUCOSE MONITORING CONTINUOUS          Imaging Results          X-Ray Hip 2 View Right (Final result)  Result  time 11/04/19 02:20:08    Final result by Mick Sullivan MD (11/04/19 02:20:08)                 Impression:      No acute bony abnormality.      Electronically signed by: Mick Sullivan MD  Date:    11/04/2019  Time:    02:20             Narrative:    EXAMINATION:  XR HIP 2 VIEW RIGHT    CLINICAL HISTORY:  Pain in right leg.    TECHNIQUE:  AP view of the pelvis and frog leg lateral view of the right hip were performed.    COMPARISON:  07/30/2011.    FINDINGS:  No displaced pelvic fracture identified.  Hips are symmetric with mild degenerative changes.  Small exostosis identified involving the left iliac bone.  Stable soft tissue calcification overlying the left gluteal region.  Partially visualized postoperative changes in the lumbar spine.  Soft tissues are unremarkable.  No unexpected radiopaque foreign body.                               X-Ray Femur 2 AP/LAT Right (Final result)  Result time 11/04/19 02:12:43    Final result by Mick Sullivan MD (11/04/19 02:12:43)                 Impression:      No acute bony abnormality involving the right femur.      Electronically signed by: Mick Sullivan MD  Date:    11/04/2019  Time:    02:12             Narrative:    EXAMINATION:  XR FEMUR 2 VIEW RIGHT    CLINICAL HISTORY:  Pain in right leg.    TECHNIQUE:  AP and lateral views of the right femur were performed.    COMPARISON:  None.    FINDINGS:  No acute fracture or dislocation.  Soft tissues are symmetric.  No radiopaque foreign body.                                 Medical Decision Making:   History:   Old Medical Records: I decided to obtain old medical records.  Independently Interpreted Test(s):   I have ordered and independently interpreted X-rays - see summary below.  Clinical Tests:   Lab Tests: Ordered and Reviewed  Radiological Study: Ordered and Reviewed  Other:   I have discussed this case with another health care provider.       <> Summary of the Discussion: Hospital medicine for admission.         APC / Resident Notes:   Patient is a 64 year old male presents to the ED for emergent evaluation of drug overdose.     Will order labs and imaging. Will continue to monitor.     Differential diagnoses include, but are not limited to: polysubstance abuse, delirium, fracture, dislocation, or electrolyte imbalance.     No leukocytosis. Hemodynamically stable. Xray right hip and femur found to have No acute bony abnormality.    5:39 AM  Patient reassessed, patient resting comfortably in NAD on 2 L via NC. Patient drowsy, but easily aroused to voice. Patient admits to taking 2 xanax and 2 norco. Patient able to stand and bear weight onto b/l lower extremities without pain. Will continue to monitor.     Will admit to medicine for observation due to continued somnolence and oxygen requirement. UA and UDS pending.     I have discussed and reviewed with my supervising physician.        Clinical Impression:       ICD-10-CM ICD-9-CM   1. Accidental drug overdose, initial encounter T50.901A 977.9     E858.9   2. Right leg pain M79.604 729.5         Disposition:   Disposition: Placed in Observation  Condition: Kate Arias PA-C  11/04/19 0819

## 2019-11-04 NOTE — ED NOTES
Hospital medicine called, patient to be discharged vs admit. Physician states patient is ready to depart from hospital at this time.

## 2022-04-08 ENCOUNTER — TELEPHONE (OUTPATIENT)
Dept: OPHTHALMOLOGY | Facility: CLINIC | Age: 67
End: 2022-04-08
Payer: MEDICARE

## 2023-01-10 DIAGNOSIS — Z87.891 HISTORY OF TOBACCO USE: Primary | ICD-10-CM

## 2023-06-13 ENCOUNTER — HOSPITAL ENCOUNTER (OUTPATIENT)
Dept: RADIOLOGY | Facility: HOSPITAL | Age: 68
Discharge: HOME | End: 2023-06-13
Attending: FAMILY MEDICINE
Payer: MEDICARE

## 2023-06-13 DIAGNOSIS — Z87.891 HISTORY OF TOBACCO USE: ICD-10-CM

## 2023-06-13 PROCEDURE — 71271 CT THORAX LUNG CANCER SCR C-: CPT | Mod: TC

## 2023-06-13 PROCEDURE — 71271 CT CHEST LUNG SCREENING LOW DOSE: ICD-10-PCS | Mod: 26,,, | Performed by: STUDENT IN AN ORGANIZED HEALTH CARE EDUCATION/TRAINING PROGRAM

## 2023-06-13 PROCEDURE — 71271 CT THORAX LUNG CANCER SCR C-: CPT | Mod: 26,,, | Performed by: STUDENT IN AN ORGANIZED HEALTH CARE EDUCATION/TRAINING PROGRAM

## 2024-02-01 ENCOUNTER — TELEPHONE (OUTPATIENT)
Dept: HEPATOLOGY | Facility: CLINIC | Age: 69
End: 2024-02-01
Payer: MEDICARE

## 2024-02-01 NOTE — TELEPHONE ENCOUNTER
----- Message from Maira Bentley sent at 2/1/2024  9:14 AM CST -----  Regarding: Referral  Hello,    Provider  would like to refer the patient to the Hepatology department.      The patients diagnosis is: Hepatitis C treatment    I have scanned the patients referral and records into .     Please review and contact patient to schedule appointment. If there are no appointments available at this time, please advise and I will inform the referring provider/clinic      Thank you,   Maira Storm

## 2024-02-01 NOTE — TELEPHONE ENCOUNTER
Dr. Karel Perez ordered that patient be scheduled for a hepatology consult visit for hep c.  Patient hep c quant positive per labs in media.  Attempt made to reach patient for scheduling.  I left a VM and sent a letter asking that patient call for scheduling.

## 2024-02-01 NOTE — TELEPHONE ENCOUNTER
Patient called back.  He reports losing 2 close friends to death.  He states that he will call hepatology back for scheduling at a later date.

## 2024-02-28 NOTE — TELEPHONE ENCOUNTER
I spoke with patient.  He has cold symptoms today. He asked that appt with PA Scheuermann be moved to 3/25/24; done and appt reminder notice mailed.

## 2024-03-25 ENCOUNTER — PROCEDURE VISIT (OUTPATIENT)
Dept: HEPATOLOGY | Facility: CLINIC | Age: 69
End: 2024-03-25
Payer: MEDICARE

## 2024-03-25 ENCOUNTER — LAB VISIT (OUTPATIENT)
Dept: LAB | Facility: HOSPITAL | Age: 69
End: 2024-03-25
Payer: MEDICARE

## 2024-03-25 ENCOUNTER — OFFICE VISIT (OUTPATIENT)
Dept: HEPATOLOGY | Facility: CLINIC | Age: 69
End: 2024-03-25
Payer: MEDICARE

## 2024-03-25 VITALS — BODY MASS INDEX: 20.93 KG/M2 | HEIGHT: 70 IN | WEIGHT: 146.19 LBS

## 2024-03-25 DIAGNOSIS — B18.2 CHRONIC HEPATITIS C WITHOUT HEPATIC COMA: ICD-10-CM

## 2024-03-25 DIAGNOSIS — B18.2 CHRONIC HEPATITIS C WITHOUT HEPATIC COMA: Primary | ICD-10-CM

## 2024-03-25 LAB
HAV IGG SER QL IA: NORMAL
HBV CORE AB SERPL QL IA: NORMAL
HBV SURFACE AB SER-ACNC: <3 MIU/ML
HBV SURFACE AB SER-ACNC: NORMAL M[IU]/ML
HBV SURFACE AG SERPL QL IA: NORMAL
HIV 1+2 AB+HIV1 P24 AG SERPL QL IA: NORMAL

## 2024-03-25 PROCEDURE — 86706 HEP B SURFACE ANTIBODY: CPT | Performed by: PHYSICIAN ASSISTANT

## 2024-03-25 PROCEDURE — 99203 OFFICE O/P NEW LOW 30 MIN: CPT | Mod: S$GLB,,, | Performed by: PHYSICIAN ASSISTANT

## 2024-03-25 PROCEDURE — 1126F AMNT PAIN NOTED NONE PRSNT: CPT | Mod: CPTII,S$GLB,, | Performed by: PHYSICIAN ASSISTANT

## 2024-03-25 PROCEDURE — 86790 VIRUS ANTIBODY NOS: CPT | Performed by: PHYSICIAN ASSISTANT

## 2024-03-25 PROCEDURE — 99999 PR PBB SHADOW E&M-EST. PATIENT-LVL III: CPT | Mod: PBBFAC,,, | Performed by: PHYSICIAN ASSISTANT

## 2024-03-25 PROCEDURE — 87340 HEPATITIS B SURFACE AG IA: CPT | Performed by: PHYSICIAN ASSISTANT

## 2024-03-25 PROCEDURE — 3288F FALL RISK ASSESSMENT DOCD: CPT | Mod: CPTII,S$GLB,, | Performed by: PHYSICIAN ASSISTANT

## 2024-03-25 PROCEDURE — 1159F MED LIST DOCD IN RCRD: CPT | Mod: CPTII,S$GLB,, | Performed by: PHYSICIAN ASSISTANT

## 2024-03-25 PROCEDURE — 36415 COLL VENOUS BLD VENIPUNCTURE: CPT | Performed by: PHYSICIAN ASSISTANT

## 2024-03-25 PROCEDURE — 91200 LIVER ELASTOGRAPHY: CPT | Mod: S$GLB,,, | Performed by: PHYSICIAN ASSISTANT

## 2024-03-25 PROCEDURE — 86704 HEP B CORE ANTIBODY TOTAL: CPT | Performed by: PHYSICIAN ASSISTANT

## 2024-03-25 PROCEDURE — 1160F RVW MEDS BY RX/DR IN RCRD: CPT | Mod: CPTII,S$GLB,, | Performed by: PHYSICIAN ASSISTANT

## 2024-03-25 PROCEDURE — 3008F BODY MASS INDEX DOCD: CPT | Mod: CPTII,S$GLB,, | Performed by: PHYSICIAN ASSISTANT

## 2024-03-25 PROCEDURE — 1101F PT FALLS ASSESS-DOCD LE1/YR: CPT | Mod: CPTII,S$GLB,, | Performed by: PHYSICIAN ASSISTANT

## 2024-03-25 PROCEDURE — 87389 HIV-1 AG W/HIV-1&-2 AB AG IA: CPT | Performed by: PHYSICIAN ASSISTANT

## 2024-03-25 PROCEDURE — 87902 NFCT AGT GNTYP ALYS HEP C: CPT | Performed by: PHYSICIAN ASSISTANT

## 2024-03-25 RX ORDER — PRAVASTATIN SODIUM 40 MG/1
40 TABLET ORAL
COMMUNITY
Start: 2024-01-06

## 2024-03-25 RX ORDER — HYDROCODONE BITARTRATE AND ACETAMINOPHEN 10; 325 MG/1; MG/1
1 TABLET ORAL EVERY 6 HOURS PRN
COMMUNITY

## 2024-03-25 NOTE — PROGRESS NOTES
HEPATOLOGY CLINIC VISIT NOTE - HCV clinic  OUTSIDE REFERRING PROVIDER: Karel Alonzo MD    CHIEF COMPLAINT: Hepatitis C       HISTORY       This is a 68 y.o. White male with chronic hepatitis C, here for further eval / mngmt.   Outside records have been received / reviewed.    HCV history:  Originally diagnosed several yrs ago. Referred to specialist but did not seek care.  Prior icteric illnesses: None  Risks for HCV:  tattoos, ex-girlfriend w/ HCV  - Prior Treatment: No  - Genotype ?  - HCV RNA > 1 mill IU/mL - 1/2021 (media tab)    Liver staging:  No formal liver staging  No recent liver imaging. 2018 CT w/ unremarkable liver  Labs w/o evidence of liver dysfunction. Intermittent mild transaminase elevation. Plt > 260    Feels well  Denies jaundice, dark urine, abdominal distention, hematemesis, melena, slowed mentation.   No abnormal skin rashes. No generalized joint pain.     PMH, PSH, SOCIAL HX, FAMILY HX      Reviewed in Epic  Pertinent findings:  FAMILY HX: neg for liver diease  SOCIAL HX: resides in [place]. .  Alcohol - no hx of heavy use. Social use in past. None now  Drugs - no  Spiritual - yes, Church. [employer] important to him      ROS: as per HPI, in addition:  Back pain    PHYSICAL EXAM:  Friendly White male, in no acute distress; alert and oriented to person, place and time  HEENT: Sclerae anicteric. Patch on right eye  NECK: Supple  LUNGS: Normal respiratory effort.   ABDOMEN: Flat, soft, nontender. No organomegaly or masses. No ascites or hernias.   SKIN: Warm and dry. No jaundice, No obvious rashes. (+) tattoos  EXTREMITIES: No lower extremity edema  NEURO/PSYCH: Normal gate. Memory intact. Thought and speech pattern appropriate. Behavior normal. No depression or anxiety noted.    PERTINENT DIAGNOSTIC RESULTS      Outside labs 1/4/24:  WBC 6.9, HGB 13.2,   BUN 14, CREATININE 0.85, , K 3.9, ALBUMIN 4.0, ALKPHOS 66, TOTBILI 0.3, AST 22, ALT 21      ASSESSMENT        68 y.o.  White male with:  1. Chronic HCV, genotype  ?  - treatment naive  -- HAV status - Unknown  -- HBV status - Unknown    PLAN        Labs today  Ultrasound  FibroScan today  F/U visit. Goal of antiviral therapy    Orders Placed This Encounter   Procedures    FibroScan Transplant Hepatology(Vibration Controlled Transient Elastography)    US Abdomen Limited    Hepatitis C Genotype    Hepatitis B Surface Antigen    Hepatitis B Surface Ab, Qualitative    Hepatitis B Core Antibody, Total    Hepatitis A antibody, IgG    HIV 1/2 Ag/Ab (4th Gen)       ___________________________________________________________________  EDUCATION:  The natural history of Hepatitis C, including potential progression to cirrhosis was reviewed. We discussed the increased progression of liver disease secondary to alcohol use; patient was advised to avoid alcohol completely.     Transmission of Hepatitis C was reviewed, including possible sexual transmission. Sexual contacts should be screened. Patient should avoid sharing personal products such as razors, toothbrushes, etc.     Recommend avoiding raw seafood.  Limit acetaminophen to 2000mg daily.  __________________________________________________________________    Duration of encounter: 38 min  This includes face-to-face time and non face-to-face time preparing to see the patient (eg, review of tests), obtaining and/or reviewing separately obtained history, documenting clinical information in the electronic or other health record, independently interpreting resultsand communicating results to the patient/family/caregiver, or care coordination.

## 2024-03-25 NOTE — PROCEDURES
FibroScan Other Locations    Date/Time: 3/25/2024 2:15 PM    Performed by: Scheuermann, Jennifer B., PA  Authorized by: Scheuermann, Jennifer B., PA    Diagnosis:  HCV    Probe:  M    Universal Protocol: Patient's identity, procedure and site were verified, confirmatory pause was performed.  Discussed procedure including risks and potential complications.  Questions answered.  Patient verbalizes understanding and wishes to proceed with VCTE.     Procedure: After providing explanations of the procedure, patient was placed in the supine position with right arm in maximum abduction to allow optimal exposure of right lateral abdomen.  Patient was briefly assessed, Testing was performed in the mid-axillary location, 50Hz Shear Wave pulses were applied and the resulting Shear Wave and Propagation Speed detected with a 3.5 MHz ultrasonic signal, using the FibroScan probe, Skin to liver capsule distance and liver parenchyma were accessed during the entire examination with the FibroScan probe, Patient was instructed to breathe normally and to abstain from sudden movements during the procedure, allowing for random measurements of liver stiffness. At least 10 Shear Waves were produced, Individual measurements of each Shear Wave were calculated.  Patient tolerated the procedure well with no complications.  Meets discharge criteria as was dismissed.  Rates pain 0 out of 10.  Patient will follow up with ordering provider to review results.    Findings  Median liver stiffness score:  3.5  CAP Reading: dB/m:  184    IQR/med %:  11  Interpretation  Fibrosis interpretation is based on medial liver stiffness - Kilopascal (kPa).    Fibrosis Stage:  F 0-1  Steatosis interpretation is based on controlled attenuation parameter - (dB/m).    Steatosis Grade:  <S1

## 2024-04-01 LAB
HCV GENTYP SERPL NAA+PROBE: 3
HCV RNA SERPL NAA+PROBE-LOG IU: 6.28 LOG (10) IU/ML
HCV RNA SERPL QL NAA+PROBE: DETECTED
HCV RNA SPEC NAA+PROBE-ACNC: ABNORMAL IU/ML

## 2024-04-08 ENCOUNTER — HOSPITAL ENCOUNTER (OUTPATIENT)
Dept: RADIOLOGY | Facility: HOSPITAL | Age: 69
Discharge: HOME | End: 2024-04-08
Attending: PHYSICIAN ASSISTANT
Payer: MEDICARE

## 2024-04-08 DIAGNOSIS — B18.2 CHRONIC HEPATITIS C WITHOUT HEPATIC COMA: ICD-10-CM

## 2024-04-08 PROCEDURE — 76705 ECHO EXAM OF ABDOMEN: CPT | Mod: TC

## 2024-04-08 PROCEDURE — 76705 ECHO EXAM OF ABDOMEN: CPT | Mod: 26,,, | Performed by: STUDENT IN AN ORGANIZED HEALTH CARE EDUCATION/TRAINING PROGRAM

## 2024-04-19 ENCOUNTER — OFFICE VISIT (OUTPATIENT)
Dept: HEPATOLOGY | Facility: CLINIC | Age: 69
End: 2024-04-19
Payer: MEDICARE

## 2024-04-19 VITALS — HEIGHT: 70 IN | WEIGHT: 149.5 LBS | BODY MASS INDEX: 21.4 KG/M2

## 2024-04-19 DIAGNOSIS — B18.2 CHRONIC HEPATITIS C WITHOUT HEPATIC COMA: Primary | ICD-10-CM

## 2024-04-19 DIAGNOSIS — K76.9 LIVER LESION: ICD-10-CM

## 2024-04-19 PROCEDURE — 99999 PR PBB SHADOW E&M-EST. PATIENT-LVL II: CPT | Mod: PBBFAC,,, | Performed by: PHYSICIAN ASSISTANT

## 2024-04-19 PROCEDURE — 99213 OFFICE O/P EST LOW 20 MIN: CPT | Mod: S$GLB,,, | Performed by: PHYSICIAN ASSISTANT

## 2024-04-19 PROCEDURE — 1160F RVW MEDS BY RX/DR IN RCRD: CPT | Mod: CPTII,S$GLB,, | Performed by: PHYSICIAN ASSISTANT

## 2024-04-19 PROCEDURE — 1125F AMNT PAIN NOTED PAIN PRSNT: CPT | Mod: CPTII,S$GLB,, | Performed by: PHYSICIAN ASSISTANT

## 2024-04-19 PROCEDURE — 3008F BODY MASS INDEX DOCD: CPT | Mod: CPTII,S$GLB,, | Performed by: PHYSICIAN ASSISTANT

## 2024-04-19 PROCEDURE — 1159F MED LIST DOCD IN RCRD: CPT | Mod: CPTII,S$GLB,, | Performed by: PHYSICIAN ASSISTANT

## 2024-04-19 PROCEDURE — 1101F PT FALLS ASSESS-DOCD LE1/YR: CPT | Mod: CPTII,S$GLB,, | Performed by: PHYSICIAN ASSISTANT

## 2024-04-19 PROCEDURE — 3288F FALL RISK ASSESSMENT DOCD: CPT | Mod: CPTII,S$GLB,, | Performed by: PHYSICIAN ASSISTANT

## 2024-04-19 RX ORDER — VELPATASVIR AND SOFOSBUVIR 100; 400 MG/1; MG/1
1 TABLET, FILM COATED ORAL DAILY
Qty: 28 TABLET | Refills: 2 | Status: ACTIVE | OUTPATIENT
Start: 2024-04-19

## 2024-04-19 NOTE — PROGRESS NOTES
HEPATOLOGY CLINIC VISIT NOTE - HCV clinic  CHIEF COMPLAINT: Hepatitis C       HISTORY       This is a 68 y.o. White male with chronic hepatitis C, here for f/u    HCV history:  Originally diagnosed several yrs ago. Referred to specialist but did not seek care.  Prior icteric illnesses: None  Risks for HCV:  tattoos, ex-girlfriend w/ HCV  - Prior Treatment: No  - Genotype ?    Liver staging:  No formal liver staging  No recent liver imaging. 2018 CT w/ unremarkable liver  Labs w/o evidence of liver dysfunction. Intermittent mild transaminase elevation. Plt > 260    Interval history (4/19/24):  FibroScan 3/25/24: kPa 3.5 (F0-1),  (S0): labs / u/s support staging    Jadyn 3, RNA 1.9 mill IU/mL  HIV neg, HAVAB neg, No HBV immunity or exposure    U/S abdomen 4/8/24: no findings to suggest cirrhosis / portal HTN  0.9cm liver lesion - stable in size compared to lesion on CT 12/2018 that was felt to be flash filling hemangioma    Feels well. Denies jaundice, dark urine, hematemesis, melena, slowed mentation, abdominal distention.         PMH, PSH, SOCIAL HX, FAMILY HX      Reviewed in Epic  Pertinent findings:  FAMILY HX: neg for liver diease  SOCIAL HX: resides in [place]. .  Alcohol - no hx of heavy use. Social use in past. None now  Drugs - no  Spiritual - yes, Oriental orthodox. Jaunita important to him      ROS: as per HPI, in addition:  Rare GERD, TUMS prn    PHYSICAL EXAM:  Friendly White male, in no acute distress; alert and oriented to person, place and time  HEENT: Sclerae anicteric. Patch on right eye  NECK: Supple  LUNGS: Normal respiratory effort.   ABDOMEN: Flat, soft, nontender.   SKIN: Warm and dry. No jaundice, No obvious rashes. (+) tattoos  EXTREMITIES: No lower extremity edema  NEURO/PSYCH: Normal gate. Memory intact. Thought and speech pattern appropriate. Behavior normal. No depression or anxiety noted.    PERTINENT DIAGNOSTIC RESULTS      Outside labs 1/4/24:  WBC 6.9, HGB 13.2,   BUN 14,  CREATININE 0.85, , K 3.9, ALBUMIN 4.0, ALKPHOS 66, TOTBILI 0.3, AST 22, ALT 21    Results for orders placed during the hospital encounter of 04/08/24  US Abdomen Limited  CLINICAL HISTORY:please include spleen for portal HTN assessment;.Chronic viral hepatitis C  TECHNIQUE:Limited ultrasound of the right upper quadrant of the abdomen including pancreas, liver, gallbladder, common bile duct, IVC was performed.  COMPARISON:CT abdomen pelvis 12/26/2018    FINDINGS:  Liver: Normal in size, measuring 14.1 cm. Homogeneous echotexture. Hyperechoic lesion in the right lobe measuring 0.5 x 0.9 x 0.6 cm, which corresponds to enhancing focus about the right hepatic lobe on CT 12/26/2018.  HRI: 1.3, suggesting less than 5% steatosis.    Gallbladder: Small amount of mobile echogenic debris measuring up to 1 mm, likely small stones.  No wall thickening or pericholecystic fluid.  No wall hyperemia.  Negative sonographic Hutchison's sign.    Biliary system: The common duct is not dilated, measuring 6 mm.  No intrahepatic ductal dilatation.    Spleen: Normal in size with a homogeneous echotexture, measuring 8.5 x 2.5 cm.    Pancreas: The visualized portions of pancreas appear normal.    IVC: Normal    Miscellaneous: No ascites.    Impression  Hyperechoic liver lesion favored to represent a hemangioma in the absence of chronic liver disease or underlying malignancy.  Lesion is similar in size from comparison CT 12/26/2018 allowing for differences in technique.    Cholelithiasis without evidence for cholecystitis.      ASSESSMENT        68 y.o. White male with:  1. Chronic HCV, genotype  3 - treatment naive  -- FibroScan F0-1 w/ labs / imaging that support staging  -- HAV status - lacking immunity  -- HBV status - no immunity or exposure    2. Liver lesion on u/s, stable compared to 2018 CT  -- prior CT report indicates flash filling hemangioma    PLAN        Epclusa x 12 weeks sent to [facility]  -- Patient to  notify me of Rx start date so labs can be scheduled.  AFP w/ next labs  Twinrix sent to [facility]ri  __________________________________________________________________    Duration of encounter: 24 min  This includes face-to-face time and non face-to-face time preparing to see the patient (eg, review of tests), obtaining and/or reviewing separately obtained history, documenting clinical information in the electronic or other health record, independently interpreting resultsand communicating results to the patient/family/caregiver, or care coordination.

## 2024-04-26 ENCOUNTER — TELEPHONE (OUTPATIENT)
Dept: HEPATOLOGY | Facility: CLINIC | Age: 69
End: 2024-04-26
Payer: MEDICARE

## 2024-04-26 NOTE — TELEPHONE ENCOUNTER
Pt beginning 12 weeks Epclusa on 4/27/24  Anticipated treatment end date: 7/19/24  F 0-1  Jadyn 3  Prior HCV treatment: No    Pt told [facility] start date of 4/27. Pls confirm      Pls update episode and schedule:  - LFT, HCV RNA, AFP at week 6 -   - LFT, HCV RNA - SVR12 - 10/18/24

## 2024-04-26 NOTE — TELEPHONE ENCOUNTER
----- Message from Gem Martin MA sent at 4/26/2024  2:01 PM CDT -----  Regarding: FW: Patient advice  Contact: Pt  [phone]    ----- Message -----  From: Domenica Najera  Sent: 4/26/2024  11:21 AM CDT  To: Scheuermann Jennifer B Staff  Subject: Patient advice                                             Name of Caller:  Bossman      Contact Preference: [phone]    Nature of Call:   Called to give update that he received medication today

## 2024-04-30 DIAGNOSIS — K76.9 LIVER LESION: ICD-10-CM

## 2024-04-30 DIAGNOSIS — B18.2 CHRONIC HEPATITIS C WITHOUT HEPATIC COMA: Primary | ICD-10-CM

## 2024-04-30 NOTE — TELEPHONE ENCOUNTER
I spoke with patient.  Msg from PA Scheuermann relayed and mailed to him.  Labs scheduled 6/7/24 and 10/18/24; appt reminder notices mailed.

## 2024-07-09 ENCOUNTER — TELEPHONE (OUTPATIENT)
Dept: HEPATOLOGY | Facility: CLINIC | Age: 69
End: 2024-07-09
Payer: MEDICARE

## 2024-07-09 NOTE — TELEPHONE ENCOUNTER
----- Message from Laura Eason sent at 7/9/2024  1:00 PM CDT -----  Regarding: R/S Appt  Contact: [phone]  CONSULT/ADVISORY    Name of Caller: SAURABH LUCAS [[id number]]    Contact Preference:[phone]    Nature of Call:  Pt is calling to r/s labs for today due to a death in the family.   Please call.

## 2024-07-11 DIAGNOSIS — F17.210 CIGARETTE SMOKER: Primary | ICD-10-CM

## 2024-07-18 ENCOUNTER — TELEPHONE (OUTPATIENT)
Dept: HEPATOLOGY | Facility: CLINIC | Age: 69
End: 2024-07-18
Payer: MEDICARE

## 2024-07-18 NOTE — TELEPHONE ENCOUNTER
----- Message from Precious Fischer RN sent at 7/18/2024 12:03 PM CDT -----  Patient not responding.  Episode moved to svr 12 date.  AFP added to draw.

## 2024-08-08 DIAGNOSIS — F17.210 CIGARETTE SMOKER: Primary | ICD-10-CM

## 2024-08-13 DIAGNOSIS — F17.210 CIGARETTE SMOKER: Primary | ICD-10-CM

## 2024-08-19 ENCOUNTER — HOSPITAL ENCOUNTER (OUTPATIENT)
Dept: RADIOLOGY | Facility: HOSPITAL | Age: 69
Discharge: HOME | End: 2024-08-19
Attending: ORTHOPAEDIC SURGERY
Payer: MEDICARE

## 2024-08-19 DIAGNOSIS — F17.210 CIGARETTE SMOKER: ICD-10-CM

## 2024-08-19 PROCEDURE — 71271 CT THORAX LUNG CANCER SCR C-: CPT | Mod: 26,,, | Performed by: RADIOLOGY

## 2024-08-19 PROCEDURE — 71271 CT THORAX LUNG CANCER SCR C-: CPT | Mod: TC

## 2024-08-20 ENCOUNTER — TELEPHONE (OUTPATIENT)
Dept: HEPATOLOGY | Facility: CLINIC | Age: 69
End: 2024-08-20
Payer: MEDICARE

## 2024-08-20 NOTE — TELEPHONE ENCOUNTER
Patient advised of lab results and other messages from provider.  HCV treatment was confirmed by patient for late April.  A copy of lab appointment has been mailed out.

## 2024-08-20 NOTE — TELEPHONE ENCOUNTER
Pt began 12 weeks Epclusa on 4/27/24  Anticipated treatment end date: 7/19/24  F 0-1  Jadyn 3  Prior HCV treatment: No    8/19: LFT normal, HCV neg, AFP normal    Please tell pt:  Labs look good  Liver numbers are normal. Hep C virus is negative.   According to our records he started HCV treatment in late April. (Let me know if this is not correct!!) If this is correct, he should have now been off of the epclusa for ~ 1 month.  There is still a small chance the virus can return; it is too early to know if he is cured.    Next labs to see if Hep C is cured are due (already scheduled):   - LFT, HCV RNA - SVR12 - 10/18/24

## 2024-10-23 ENCOUNTER — LAB VISIT (OUTPATIENT)
Dept: LAB | Facility: HOSPITAL | Age: 69
End: 2024-10-23
Payer: MEDICARE

## 2024-10-23 DIAGNOSIS — B18.2 CHRONIC HEPATITIS C WITHOUT HEPATIC COMA: ICD-10-CM

## 2024-10-23 DIAGNOSIS — K76.9 LIVER LESION: ICD-10-CM

## 2024-10-23 LAB
ALBUMIN SERPL BCP-MCNC: 4 G/DL (ref 3.5–5.2)
ALP SERPL-CCNC: 78 U/L (ref 40–150)
ALT SERPL W/O P-5'-P-CCNC: 15 U/L (ref 10–44)
AST SERPL-CCNC: 22 U/L (ref 10–40)
BILIRUB DIRECT SERPL-MCNC: 0.1 MG/DL (ref 0.1–0.3)
BILIRUB SERPL-MCNC: 0.3 MG/DL (ref 0.1–1)
PROT SERPL-MCNC: 7.5 G/DL (ref 6–8.4)

## 2024-10-23 PROCEDURE — 87522 HEPATITIS C REVRS TRNSCRPJ: CPT | Performed by: PHYSICIAN ASSISTANT

## 2024-10-23 PROCEDURE — 80076 HEPATIC FUNCTION PANEL: CPT | Performed by: PHYSICIAN ASSISTANT

## 2024-10-23 PROCEDURE — 36415 COLL VENOUS BLD VENIPUNCTURE: CPT | Performed by: PHYSICIAN ASSISTANT

## 2024-10-24 ENCOUNTER — TELEPHONE (OUTPATIENT)
Dept: HEPATOLOGY | Facility: CLINIC | Age: 69
End: 2024-10-24
Payer: MEDICARE

## 2024-10-24 DIAGNOSIS — Z86.19 HEPATITIS C VIRUS INFECTION CURED AFTER ANTIVIRAL DRUG THERAPY: Primary | ICD-10-CM

## 2024-10-24 PROBLEM — B18.2 CHRONIC HEPATITIS C WITHOUT HEPATIC COMA: Status: RESOLVED | Noted: 2024-03-25 | Resolved: 2024-10-24

## 2024-10-24 LAB
HCV RNA SERPL QL NAA+PROBE: NOT DETECTED
HCV RNA SPEC NAA+PROBE-ACNC: NOT DETECTED IU/ML

## 2024-10-24 NOTE — TELEPHONE ENCOUNTER
Pt began 12 weeks Epclusa on 4/27/24  Anticipated treatment end date: 7/19/24  F 0-1  Jadyn 3  Prior HCV treatment: No    10/23: LFT normal, HCV neg      These labs document SVR12 following successful HCV treatment with Epclusa    please tell patient:  1.) Lab test shows there is NO Hepatitis C in the blood. This means the Hepatitis C is cured!!  We do not expect the virus to return.  This does not give protection from Hepatitis C and patient could be infected again if ever exposed to the virus again.      Please schedule   - HCV RNA in 6 months

## 2024-10-24 NOTE — TELEPHONE ENCOUNTER
Attempt made to reach patient.  Unable to leave a VM.  Msg from provider mailed to him.  Lab draw scheduled 4/16/25; appt reminder notice mailed.

## 2025-08-04 ENCOUNTER — HOSPITAL ENCOUNTER (OUTPATIENT)
Dept: RADIOLOGY | Facility: HOSPITAL | Age: 70
Discharge: HOME | End: 2025-08-04
Attending: FAMILY MEDICINE
Payer: MEDICARE

## 2025-08-04 DIAGNOSIS — F17.210 CIGARETTE SMOKER: ICD-10-CM

## 2025-08-04 PROCEDURE — 71271 CT THORAX LUNG CANCER SCR C-: CPT | Mod: 26,,, | Performed by: RADIOLOGY

## 2025-08-04 PROCEDURE — 71271 CT THORAX LUNG CANCER SCR C-: CPT | Mod: TC
